# Patient Record
Sex: FEMALE | Race: WHITE | NOT HISPANIC OR LATINO | URBAN - METROPOLITAN AREA
[De-identification: names, ages, dates, MRNs, and addresses within clinical notes are randomized per-mention and may not be internally consistent; named-entity substitution may affect disease eponyms.]

---
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
## 2024-07-08 NOTE — ED ADULT NURSE NOTE - NS ED NURSE RECORD ANOTHER VITAL SIGN
Patient presents with multiple issues.  Again she is having pain in the dorsum of the right foot secondary to osteoarthritis.  Cortisone injection was desired.    Injected right foot at the second metatarsal cuneiform joint with 0.5 cc Kenalog 40 along with 1 cc 1% Xylocaine.    Patient also having difficulty with toenails and a soft corn left second toe.  The second and third nails of the left foot are thick and yellow with subungual debris.  Treatment consisted of nail debridement removing devitalized tissue and subungual debris and reducing nails in thickness.  Electrical and manual debridement performed.  Trimmed all remaining nails.    Patient also has soft corn medial aspect left second toe.  This lesion was trimmed.    Small joint arthrocentesis: R intertarsal  Universal Protocol:  Consent: Verbal consent obtained.  Risks and benefits: risks, benefits and alternatives were discussed  Consent given by: patient  Patient identity confirmed: verbally with patient  Supporting Documentation  Indications: pain   Procedure Details  Location: foot - R intertarsal  Needle size: 25 G  Approach: dorsal  Medications administered: 1 mL lidocaine 1 %; 20 mg triamcinolone acetonide 40 mg/mL        Lesion Destruction    Date/Time: 7/8/2024 9:30 AM    Performed by: Pablo Rivera DPM  Authorized by: Pablo Rivera DPM    Procedure Details - Lesion Destruction:     Number of Lesions:  1  Lesion 1:     Body area:  Lower extremity    Lower extremity location:  L second toe    Malignancy: benign hyperkeratotic lesion      Destruction method: scissors used for extraction        
Yes

## 2024-10-05 ENCOUNTER — EMERGENCY (EMERGENCY)
Facility: HOSPITAL | Age: 19
LOS: 1 days | Discharge: HOME | End: 2024-10-05
Attending: STUDENT IN AN ORGANIZED HEALTH CARE EDUCATION/TRAINING PROGRAM | Admitting: STUDENT IN AN ORGANIZED HEALTH CARE EDUCATION/TRAINING PROGRAM
Payer: COMMERCIAL

## 2024-10-05 VITALS
OXYGEN SATURATION: 100 % | WEIGHT: 110.01 LBS | DIASTOLIC BLOOD PRESSURE: 96 MMHG | RESPIRATION RATE: 28 BRPM | HEIGHT: 64 IN | HEART RATE: 115 BPM | SYSTOLIC BLOOD PRESSURE: 129 MMHG | TEMPERATURE: 98 F

## 2024-10-05 PROCEDURE — 96374 THER/PROPH/DIAG INJ IV PUSH: CPT

## 2024-10-05 PROCEDURE — 96375 TX/PRO/DX INJ NEW DRUG ADDON: CPT

## 2024-10-05 PROCEDURE — 99291 CRITICAL CARE FIRST HOUR: CPT

## 2024-10-05 PROCEDURE — 99291 CRITICAL CARE FIRST HOUR: CPT | Mod: 25

## 2024-10-05 PROCEDURE — 99053 MED SERV 10PM-8AM 24 HR FAC: CPT

## 2024-10-05 PROCEDURE — 96372 THER/PROPH/DIAG INJ SC/IM: CPT | Mod: XU

## 2024-10-05 RX ORDER — SODIUM CHLORIDE 0.9 % (FLUSH) 0.9 %
1000 SYRINGE (ML) INJECTION ONCE
Refills: 0 | Status: COMPLETED | OUTPATIENT
Start: 2024-10-05 | End: 2024-10-05

## 2024-10-05 RX ORDER — FAMOTIDINE 40 MG
20 TABLET ORAL ONCE
Refills: 0 | Status: COMPLETED | OUTPATIENT
Start: 2024-10-05 | End: 2024-10-05

## 2024-10-05 RX ORDER — DIPHENHYDRAMINE HCL 12.5MG/5ML
50 LIQUID (ML) ORAL ONCE
Refills: 0 | Status: COMPLETED | OUTPATIENT
Start: 2024-10-05 | End: 2024-10-05

## 2024-10-05 RX ORDER — METHYLPREDNISOLONE ACETATE 80 MG/ML
125 INJECTION, SUSPENSION INTRA-ARTICULAR; INTRALESIONAL; INTRAMUSCULAR; SOFT TISSUE ONCE
Refills: 0 | Status: COMPLETED | OUTPATIENT
Start: 2024-10-05 | End: 2024-10-05

## 2024-10-05 RX ADMIN — Medication 1000 MILLILITER(S): at 23:08

## 2024-10-05 RX ADMIN — Medication 20 MILLIGRAM(S): at 23:04

## 2024-10-05 RX ADMIN — Medication 50 MILLIGRAM(S): at 23:04

## 2024-10-05 RX ADMIN — Medication 0.3 MILLIGRAM(S): at 22:50

## 2024-10-05 RX ADMIN — METHYLPREDNISOLONE ACETATE 125 MILLIGRAM(S): 80 INJECTION, SUSPENSION INTRA-ARTICULAR; INTRALESIONAL; INTRAMUSCULAR; SOFT TISSUE at 23:04

## 2024-10-05 NOTE — ED PROVIDER NOTE - NSFOLLOWUPCLINICS_GEN_ALL_ED_FT
[facility] Immunology  Allergy  [address] [place] [address]  Phone: [phone]  Fax:   Follow Up Time: 1-3 Days    [facility]- [facility]  Allergy  [address] [place] [address] [address]  [place] [address]  Phone: [phone]  Fax: [phone]  Follow Up Time: 1-3 Days

## 2024-10-05 NOTE — ED PROVIDER NOTE - PATIENT PORTAL LINK FT
You can access the FollowMyHealth Patient Portal offered by [facility] by registering at the following website: [email or web address]. By joining [facility]’s FollowMyHealth portal, you will also be able to view your health information using other applications (apps) compatible with our system.

## 2024-10-05 NOTE — ED PROVIDER NOTE - CLINICAL SUMMARY MEDICAL DECISION MAKING FREE TEXT BOX
19-year-old female history of peanut allergy presents with shortness of breath difficulty breathing.  Patient brought in by boyfriend.  Said about hour ago had cookies with pecans in it unknowingly.  Patient presenting with shortness of breath itchiness feeling chest tightness and wheezing.  1 tablet of Benadryl an hour prior to ED arrival.  Has history of using EpiPen before.  Admits was drinking prior   Exam as stated  Given epi emergently in setting of anaphylaxis. Giving meds IVF and will monitor. Sxs improving after epi, will continue to monitor 19-year-old female history of peanut allergy presents with shortness of breath difficulty breathing.  Patient brought in by boyfriend.  Said about hour ago had cookies with pecans in it unknowingly.  Patient presenting with shortness of breath itchiness feeling chest tightness and wheezing.  1 tablet of Benadryl an hour prior to ED arrival.  Has history of using EpiPen before.  Admits was drinking prior   Exam as stated  Given epi emergently in setting of anaphylaxis. Giving meds IVF and will monitor. Sxs improving after epi, will continue to monitor  Epi pen sent to pharmacy with refill 19-year-old female history of peanut allergy presents with shortness of breath difficulty breathing.  Patient brought in by boyfriend.  Said about hour ago had cookies with pecans in it unknowingly.  Patient presenting with shortness of breath itchiness feeling chest tightness and wheezing.  1 tablet of Benadryl an hour prior to ED arrival.  Has history of using EpiPen before.  Admits was drinking prior   Exam as stated  Given epi emergently in setting of anaphylaxis. Giving meds IVF and will monitor. Sxs improving after epi, will continue to monitor  Epi pen sent to pharmacy with refill  Patient observed no return of symptoms.  Upon discharge vital signs stable symptoms significantly improved.  Oral pharynx within normal limits clear to auscultation bilaterally.  Patient to be discharged return precautions given and will give allergy follow-up.

## 2024-10-05 NOTE — ED PROVIDER NOTE - OBJECTIVE STATEMENT
19-year-old female history of peanut allergy presents with shortness of breath difficulty breathing.  Patient brought in by boyfriend.  Said about hour ago had cookies with pecans in it unknowingly.  Patient presenting with shortness of breath itchiness feeling chest tightness and wheezing.  1 tablet of Benadryl an hour prior to ED arrival.  Has history of using EpiPen before.

## 2024-10-05 NOTE — ED ADULT NURSE NOTE - OBJECTIVE STATEMENT
Patient arrived to ED with anaphylaxis reaction to tree nuts, Patient A&Ox4 RA SOB wheezing, patient placed on 15L Nonrebreather, MD @bedside, patient denies pain, denies pmh or daily medication use, patient states she has a allergy to tree nuts and ate a cookie containing tree nuts but she was unaware, safety measures maintained, call bell within reach, nursing care continued

## 2024-10-05 NOTE — ED PROVIDER NOTE - PHYSICAL EXAMINATION
CONSTITUTIONAL: NAD  SKIN: Warm dry, erythematous chest  HEAD: NCAT  EYES: NL inspection  ENT: MMM  NECK: Supple; non tender.  CARD: RRR  RESP: diffuse wheeze, tachypneic  ABD: S/NT no R/G  EXT: no pedal edema  NEURO: Grossly unremarkable  PSYCH: Cooperative, appropriate.

## 2024-10-06 VITALS
RESPIRATION RATE: 18 BRPM | HEART RATE: 72 BPM | OXYGEN SATURATION: 100 % | SYSTOLIC BLOOD PRESSURE: 102 MMHG | DIASTOLIC BLOOD PRESSURE: 62 MMHG | TEMPERATURE: 99 F

## 2024-10-06 PROBLEM — Z78.9 OTHER SPECIFIED HEALTH STATUS: Chronic | Status: ACTIVE | Noted: 2021-09-07
